# Patient Record
Sex: MALE | URBAN - METROPOLITAN AREA
[De-identification: names, ages, dates, MRNs, and addresses within clinical notes are randomized per-mention and may not be internally consistent; named-entity substitution may affect disease eponyms.]

---

## 2018-08-22 ENCOUNTER — TRANSFERRED RECORDS (OUTPATIENT)
Dept: HEALTH INFORMATION MANAGEMENT | Facility: CLINIC | Age: 28
End: 2018-08-22

## 2018-11-01 ENCOUNTER — TRANSFERRED RECORDS (OUTPATIENT)
Dept: HEALTH INFORMATION MANAGEMENT | Facility: CLINIC | Age: 28
End: 2018-11-01

## 2019-01-15 ENCOUNTER — TRANSFERRED RECORDS (OUTPATIENT)
Dept: HEALTH INFORMATION MANAGEMENT | Facility: CLINIC | Age: 29
End: 2019-01-15

## 2019-01-17 ENCOUNTER — MEDICAL CORRESPONDENCE (OUTPATIENT)
Dept: HEALTH INFORMATION MANAGEMENT | Facility: CLINIC | Age: 29
End: 2019-01-17

## 2019-01-29 ENCOUNTER — DOCUMENTATION ONLY (OUTPATIENT)
Dept: SURGERY | Facility: CLINIC | Age: 29
End: 2019-01-29

## 2019-01-29 NOTE — PROGRESS NOTES
Copy of referral sent via email with question re: scheduling.  Unsure if this falls under thoracic or not.

## 2019-01-31 ENCOUNTER — TELEPHONE (OUTPATIENT)
Dept: SURGERY | Facility: CLINIC | Age: 29
End: 2019-01-31

## 2019-01-31 DIAGNOSIS — S22.23XS: Primary | ICD-10-CM
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
## 2019-02-16 NOTE — TELEPHONE ENCOUNTER
RECORDS STATUS - ALL OTHER DIAGNOSIS      RECORDS RECEIVED FROM: [facility] (in [facility]/) & [facility] (in [facility])   DATE RECEIVED: 2/16/19   NOTES STATUS DETAILS   OFFICE NOTE from referring provider 1/15/19 In [facility]     OFFICE NOTE from medical oncologist None None     DISCHARGE SUMMARY from hospital None None     DISCHARGE REPORT from the ER 4/22/18 In [facility]     OPERATIVE REPORT None None     MEDICATION LIST In Epic/CE In [facility]     CLINICAL TRIAL TREATMENTS TO DATE None None     LABS     PATHOLOGY REPORTS None None     ANYTHING RELATED TO DIAGNOSIS In Epic/ In Epic/CE     GENONOMIC TESTING     TYPE: In Epic/ In Epic/CE   IMAGING (NEED IMAGES & REPORT)     PFT 2/21/19 Scheduled @[facility] [place]   XRAYS 8/24/18 Sternum  4/22/18 Chest In Epic/CE  In Epic/CE   CT SCANS 2/21/19 Chest          11/2/18 Chest  4/22/18 Chest  4/22/18 Facial  4/22/18 Cervical Spine  4/22/18 Head Scheduled @ [facility]      In [facility]/  In Epic/CE  In Epic/CE  In Epic/CE  In Epic/CE   MRI None None   MAMMO None None   ULTRASOUND None None   PET None None

## 2019-02-21 ENCOUNTER — OFFICE VISIT (OUTPATIENT)
Dept: SURGERY | Facility: CLINIC | Age: 29
End: 2019-02-21
Attending: STUDENT IN AN ORGANIZED HEALTH CARE EDUCATION/TRAINING PROGRAM
Payer: COMMERCIAL

## 2019-02-21 ENCOUNTER — PRE VISIT (OUTPATIENT)
Dept: SURGERY | Facility: CLINIC | Age: 29
End: 2019-02-21

## 2019-02-21 ENCOUNTER — ANCILLARY PROCEDURE (OUTPATIENT)
Dept: CT IMAGING | Facility: CLINIC | Age: 29
End: 2019-02-21
Attending: CLINICAL NURSE SPECIALIST
Payer: COMMERCIAL

## 2019-02-21 VITALS
WEIGHT: 180 LBS | HEART RATE: 70 BPM | BODY MASS INDEX: 25.2 KG/M2 | DIASTOLIC BLOOD PRESSURE: 75 MMHG | TEMPERATURE: 97.4 F | OXYGEN SATURATION: 100 % | RESPIRATION RATE: 14 BRPM | HEIGHT: 71 IN | SYSTOLIC BLOOD PRESSURE: 126 MMHG

## 2019-02-21 DIAGNOSIS — S22.23XS: ICD-10-CM

## 2019-02-21 DIAGNOSIS — S22.20XS CLOSED FRACTURE OF STERNUM, UNSPECIFIED PORTION OF STERNUM, SEQUELA: Primary | ICD-10-CM

## 2019-02-21 PROCEDURE — G0463 HOSPITAL OUTPT CLINIC VISIT: HCPCS | Mod: ZF

## 2019-02-21 SDOH — HEALTH STABILITY: MENTAL HEALTH: HOW OFTEN DO YOU HAVE A DRINK CONTAINING ALCOHOL?: 2-4 TIMES A MONTH

## 2019-02-21 ASSESSMENT — MIFFLIN-ST. JEOR: SCORE: 1810.85

## 2019-02-21 ASSESSMENT — PAIN SCALES - GENERAL: PAINLEVEL: NO PAIN (0)
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
## 2019-02-21 NOTE — NURSING NOTE
"Oncology Rooming Note    February 21, 2019 12:04 PM   Jese Cherry is a 29 year old male who presents for:    Chief Complaint   Patient presents with     Oncology Clinic Visit     [facility] NEW- STERNUM INJURY     Initial Vitals: /75 (BP Location: Right arm, Patient Position: Chair, Cuff Size: Adult Regular)   Pulse 70   Temp 97.4  F (36.3  C) (Oral)   Resp 14   Ht 1.815 m (5' 11.46\")   Wt 81.6 kg (180 lb)   SpO2 100%   BMI 24.78 kg/m   Estimated body mass index is 24.78 kg/m  as calculated from the following:    Height as of this encounter: 1.815 m (5' 11.46\").    Weight as of this encounter: 81.6 kg (180 lb). Body surface area is 2.03 meters squared.  No Pain (0) Comment: Data Unavailable   No LMP for male patient.  Allergies reviewed: Yes  Medications reviewed: Yes    Medications: Medication refills not needed today.  Pharmacy name entered into EPIC: Data Unavailable    Clinical concerns: No new concerns. Sabillon was notified.      Yimi Banegas LPN            "
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
## 2019-02-26 LAB
DLCOUNC-%PRED-PRE: 109 %
DLCOUNC-PRE: 38.11 ML/MIN/MMHG
DLCOUNC-PRED: 34.83 ML/MIN/MMHG
ERV-%PRED-PRE: 98 %
ERV-PRE: 1.92 L
ERV-PRED: 1.94 L
EXPTIME-PRE: 2.66 SEC
FEF2575-%PRED-PRE: 124 %
FEF2575-PRE: 5.72 L/SEC
FEF2575-PRED: 4.6 L/SEC
FEFMAX-%PRED-PRE: 82 %
FEFMAX-PRE: 8.85 L/SEC
FEFMAX-PRED: 10.71 L/SEC
FEV1-%PRED-PRE: 100 %
FEV1-PRE: 4.52 L
FEV1FEV6-PRE: 89 %
FEV1FEV6-PRED: 83 %
FEV1FVC-PRE: 89 %
FEV1FVC-PRED: 84 %
FEV1SVC-PRE: 92 %
FEV1SVC-PRED: 74 %
FIFMAX-PRE: 3.79 L/SEC
FRCPLETH-%PRED-PRE: 96 %
FRCPLETH-PRE: 3.33 L
FRCPLETH-PRED: 3.45 L
FVC-%PRED-PRE: 94 %
FVC-PRE: 5.1 L
FVC-PRED: 5.39 L
IC-%PRED-PRE: 72 %
IC-PRE: 2.99 L
IC-PRED: 4.12 L
RVPLETH-%PRED-PRE: 78 %
RVPLETH-PRE: 1.41 L
RVPLETH-PRED: 1.8 L
TLCPLETH-%PRED-PRE: 83 %
TLCPLETH-PRE: 6.32 L
TLCPLETH-PRED: 7.53 L
VA-%PRED-PRE: 87 %
VA-PRE: 6.18 L
VC-%PRED-PRE: 80 %
VC-PRE: 4.9 L
VC-PRED: 6.05 L